# Patient Record
Sex: FEMALE | Race: WHITE | NOT HISPANIC OR LATINO | Employment: FULL TIME | URBAN - NONMETROPOLITAN AREA
[De-identification: names, ages, dates, MRNs, and addresses within clinical notes are randomized per-mention and may not be internally consistent; named-entity substitution may affect disease eponyms.]

---
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
## 2023-04-25 DIAGNOSIS — N93.9 ABNORMAL UTERINE BLEEDING: ICD-10-CM

## 2023-04-25 RX ORDER — LEVONORGESTREL AND ETHINYL ESTRADIOL 0.1-0.02MG
KIT ORAL
Qty: 84 TABLET | Refills: 3 | Status: SHIPPED | OUTPATIENT
Start: 2023-04-25

## 2023-04-26 NOTE — TELEPHONE ENCOUNTER
Received request via: Pharmacy    Was the patient seen in the last year in this department? Yes    Does the patient have an active prescription (recently filled or refills available) for medication(s) requested? No    Does the patient have snf Plus and need 100 day supply (blood pressure, diabetes and cholesterol meds only)? Patient does not have SCP    Last office Visit:07/21/2023  Last labs:07/08/2022

## 2023-11-21 ENCOUNTER — OCCUPATIONAL MEDICINE (OUTPATIENT)
Dept: URGENT CARE | Facility: PHYSICIAN GROUP | Age: 18
End: 2023-11-21
Payer: COMMERCIAL

## 2023-11-21 VITALS
HEIGHT: 67 IN | RESPIRATION RATE: 18 BRPM | SYSTOLIC BLOOD PRESSURE: 124 MMHG | TEMPERATURE: 98.3 F | HEART RATE: 100 BPM | WEIGHT: 292 LBS | OXYGEN SATURATION: 97 % | DIASTOLIC BLOOD PRESSURE: 64 MMHG | BODY MASS INDEX: 45.83 KG/M2

## 2023-11-21 DIAGNOSIS — S09.92XA INJURY OF NOSE, INITIAL ENCOUNTER: ICD-10-CM

## 2023-11-21 DIAGNOSIS — S09.90XA INJURY OF HEAD, INITIAL ENCOUNTER: ICD-10-CM

## 2023-11-21 DIAGNOSIS — R51.9 ACUTE NONINTRACTABLE HEADACHE, UNSPECIFIED HEADACHE TYPE: ICD-10-CM

## 2023-11-21 PROCEDURE — 3078F DIAST BP <80 MM HG: CPT | Performed by: FAMILY MEDICINE

## 2023-11-21 PROCEDURE — 99213 OFFICE O/P EST LOW 20 MIN: CPT | Performed by: FAMILY MEDICINE

## 2023-11-21 PROCEDURE — 3074F SYST BP LT 130 MM HG: CPT | Performed by: FAMILY MEDICINE

## 2023-11-21 ASSESSMENT — FIBROSIS 4 INDEX: FIB4 SCORE: 0.24

## 2023-11-21 NOTE — LETTER
[facility]  [address] [place] [address]  Phone:  [phone] - Fax:  [phone]   [facility] Progress Report and Disability Certification  Date of Service: 11/21/2023   No Show:  No  Date / Time of Next Visit: 11/28/2023   Claim Information   Patient Name: Sabi Blank  Claim Number:     Employer: [employer]  Date of Injury: 11/21/2023     Insurer / TPA: [employer]  ID / SSN:     Occupation:   Diagnosis: Diagnoses of Injury of head, initial encounter, Acute nonintractable headache, unspecified headache type, and Injury of nose, initial encounter were pertinent to this visit.    Medical Information   Related to Industrial Injury?      Subjective Complaints:  DOI: 11/21/2023   QIANA: She was helping a student nap and he accidentally head budded the patient's nose. There was no vomiting or loss of consciousness. She subsequently has a headache, the pain is located in her center forehead, the pain is described as tingling and aching, currently rated 3/10. She has not yet had anything for pain. Denies prior head injury. No secondary employment.     Nose trauma red flags:  -Can you breathe through both sides of your nose: yes  -Are you having trouble speaking: no  -Do have double vision or any other trouble with your vision: no  -Is your hearing normal: no  -Are you experiencing numbness to your face: no  -Have you had previous facial injury or surgery: no  -Do your teeth come together the way they did yesterday: yes  -Are any of your teeth painful or loose: no  -Are you bleeding from your mouth, nose, or ears: no     Objective Findings: No discolorations or deformities on inspection of face.  Cranial nerves II through XII grossly intact.  Normal gait   Pre-Existing Condition(s):     Assessment:   Initial Visit    Status: Additional Care Required  Permanent Disability:No    Plan:      Diagnostics:      Comments:  -We will  trial full duty  -Ice, heat, Tylenol, and ibuprofen as needed for nose/head pain    Disability Information   Status: Released to Full Duty    From:  11/21/2023  Through: 11/28/2023 Restrictions are: Temporary   Physical Restrictions   Sitting:    Standing:    Stooping:    Bending:      Squatting:    Walking:    Climbing:    Pushing:      Pulling:    Other:    Reaching Above Shoulder (L):   Reaching Above Shoulder (R):       Reaching Below Shoulder (L):    Reaching Below Shoulder (R):      Not to exceed Weight Limits   Carrying(hrs):   Weight Limit(lb):   Lifting(hrs):   Weight  Limit(lb):     Comments:      Repetitive Actions   Hands: i.e. Fine Manipulations from Grasping:     Feet: i.e. Operating Foot Controls:     Driving / Operate Machinery:     Health Care Provider’s Original or Electronic Signature  Marielle Good M.D. Health Care Provider’s Original or Electronic Signature    Tae Parker DO MPH     Clinic Name / Location: [address] [address] Clinic Phone Number: Dept: [phone]   Appointment Time: 2:45 Pm Visit Start Time: 3:22 PM   Check-In Time:  3:17 Pm Visit Discharge Time: 4:00 PM   Original-Treating Physician or Chiropractor    Page 2-Insurer/TPA    Page 3-Employer    Page 4-Employee       English

## 2023-11-21 NOTE — PROGRESS NOTES
"  Subjective:     18 y.o. female presents for Facial Injury (Work[employer] Comp [employer]. Lisa head hit nose. Swelling, headache)      DOI: 11/21/2023   QIANA: She was helping a student nap and he accidentally head budded the patient's nose. There was no vomiting or loss of consciousness. She subsequently has a headache, the pain is located in her center forehead, the pain is described as tingling and aching, currently rated 3/10. She has not yet had anything for pain. Denies prior head injury. No secondary employment.     Nose trauma red flags:  -Can you breathe through both sides of your nose: yes  -Are you having trouble speaking: no  -Do have double vision or any other trouble with your vision: no  -Is your hearing normal: no  -Are you experiencing numbness to your face: no  -Have you had previous facial injury or surgery: no  -Do your teeth come together the way they did yesterday: yes  -Are any of your teeth painful or loose: no  -Are you bleeding from your mouth, nose, or ears: no      PMH:   No pertinent past medical history to this problem  MEDS:  Medications were reviewed in EMR  ALLERGIES:  Allergies were reviewed in EMR  FH:   No pertinent family history to this problem     Objective:     /64 (BP Location: Left arm, Patient Position: Sitting, BP Cuff Size: Adult)   Pulse 100   Temp 36.8 °C (98.3 °F) (Temporal)   Resp 18   Ht 1.702 m (5' 7\")   Wt (!) 132 kg (292 lb)   SpO2 97%   BMI 45.73 kg/m²     No discolorations or deformities on inspection of face.  Cranial nerves II through XII grossly intact.  Normal gait    Assessment/Plan:     1. Injury of head, initial encounter    2. Acute nonintractable headache, unspecified headache type    3. Injury of nose, initial encounter    Released to Full Duty FROM 11/21/2023 TO 11/28/2023     -We will trial full duty  -Ice, heat, Tylenol, and ibuprofen as needed for nose/head pain    Differential diagnosis, natural history, supportive care, and indications for " immediate follow-up discussed.

## 2023-11-21 NOTE — LETTER
"    EMPLOYEE’S CLAIM FOR COMPENSATION/ REPORT OF INITIAL TREATMENT  FORM C-4  PLEASE TYPE OR PRINT    EMPLOYEE’S CLAIM - PROVIDE ALL INFORMATION REQUESTED   First Name                    PLACIDO Celestin Last Name  Abhay Birthdate                    2005                Sex  []M  []F Claim Number (Insurer’s Use Only)     Home Address  [address] Age  18 y.o. Height  1.702 m (5' 7\") Weight  (!) 132 kg (292 lb) Social Security Number     [facility] Zip  [address] Telephone  [phone] (home)    Mailing Address  [address] [facility] Zip  [address] Primary Language Spoken  English    INSURER  [employer]   [employer]   Employee's Occupation (Job Title) When Injury or Occupational Disease Occurred      Employer's Name/Company Name  [employer]  Telephone  [phone]    Office Mail Address (Number and Street)  [facility] [employer]      Date of Injury (if applicable) 11/21/2023               Hours Injury (if applicable)            am               pm Date Employer Notified  11/21/2023 Last Day of Work after Injury or Occupational Disease  11/21/2023 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 (if applicable)  I was putting a child to bed    How did this injury or occupational disease occur? (Be specific and answer in detail. Use additional sheet if necessary)  I was putting a child to bed. I was laying next to the child. the child rolled over their head hit my nose.   If you believe that you have an occupational disease, when did you first have knowledge of the disability and its relationship to your employment?  n/a Witnesses to the Accident (if applicable)  n/a      Nature of Injury or " Occupational Disease  Workers' Compensation  Part(s) of Body Injured or Affected  Nose N/A N/A    I CERTIFY THAT THE ABOVE IS TRUE AND CORRECT TO T HE BEST OF MY KNOWLEDGE AND THAT I HAVE PROVIDED THIS INFORMATION IN ORDER TO OBTAIN THE BENEFITS OF [place], OR [place]).  I HEREBY AUTHORIZE ANY PHYSICIAN, CHIROPRACTOR, SURGEON, PRACTITIONER OR ANY OTHER PERSON, ANY HOSPITAL, INCLUDING [facility] OR [facility], ANY  MEDICAL SERVICE ORGANIZATION, ANY INSURANCE COMPANY, OR OTHER INSTITUTION OR ORGANIZATION TO RELEASE TO EACH OTHER, ANY MEDICAL OR OTHER INFORMATION, INCLUDING BENEFITS PAID OR PAYABLE, PERTINENT TO THIS INJURY OR DISEASE, EXCEPT INFORMATION RELATIVE TO DIAGNOSIS, TREATMENT AND/OR COUNSELING FOR AIDS, PSYCHOLOGICAL CONDITIONS, ALCOHOL OR CONTROLLED SUBSTANCES, FOR WHICH I MUST GIVE SPECIFIC AUTHORIZATION.  A PHOTOSTAT OF THIS AUTHORIZATION SHALL BE VALID AS THE ORIGINAL.     Date   Place Employee’s Original or  *Electronic Signature   THIS REPORT MUST BE COMPLETED AND MAILED WITHIN 3 WORKING DAYS OF TREATMENT   Place  [facility]    Name of Facility  [place]   Date 11/21/2023 Diagnosis and Description of Injury or Occupational Disease  (S09.90XA) Injury of head, initial encounter  (R51.9) Acute nonintractable headache, unspecified headache type  (S09.92XA) Injury of nose, initial encounter  Diagnoses of Injury of head, initial encounter, Acute nonintractable headache, unspecified headache type, and Injury of nose, initial encounter were pertinent to this visit. Is there evidence that the injured employee was under the influence of alcohol and/or another controlled substance at the time of accident?  []No  [] Yes (if yes, please explain)   Hour 3:22 PM  No   Treatment: -We will trial full duty  -Ice, heat, Tylenol, and ibuprofen as needed for nose/head pain    Have you advised the  patient to remain off work five days or more?   [] Yes Indicate dates: From   To    []No If no, is the injured employee capable of: [] full duty [] modified duty                                                             Yes     If modified duty, specify any limitations / restrictions:                                                                                                                                                                                                                                                                                                                                                                                                                  X-Ray Findings:      From information given by the employee, together with medical evidence, can you directly connect this injury or occupational disease as job incurred?  []Yes   [] No Yes    Is additional medical care by a physician indicated? []Yes [] No  Yes    Do you know of any previous injury or disease contributing to this condition or occupational disease? []Yes [] No (Explain if yes)                          No   Date  11/21/2023 Print Health Care Provider’s Name  Shikha Good M.D. I certify that the employer’s copy of  this form was delivered to the employer on:   Address  [address] [place] [address]. INSURER'S USE ONLY                       [facility]  [address] Provider’s Tax ID Number  [id number]   Telephone  Dept: [phone]    Health Care Provider’s Original or Electronic Signature  e-SHIKHA Dominguez M.D. Degree (MD,DO, DC,PA-C,APRN)  MD  Choose (if applicable)      ORIGINAL - TREATING HEALTHCARE PROVIDER PAGE 2 - INSURER/TPA PAGE 3 - EMPLOYER PAGE 4 - EMPLOYEE             Form C-4 (rev.08/23)        BRIEF DESCRIPTION OF RIGHTS AND BENEFITS  (Pursuant to [place] 616C.050)    Notice of Injury or Occupational Disease (Incident Report Form C-1): If an injury or occupational disease (OD) arises out of and in the course  "of employment, you must provide written notice to your employer as soon as practicable, but no later than 7 days after the accident or OD. Your employer shall maintain a sufficient supply of the required forms.    Claim for Compensation (Form C-4): If medical treatment is sought, the form C-4 is available at the place of initial treatment. A completed \"Claim for Compensation\" (Form C-4) must be filed within 90 days after an accident or OD. The treating physician or chiropractor must, within 3 working days after treatment, complete and mail to the employer, the employer's insurer and third-party , the Claim for Compensation.    Medical Treatment: If you require medical treatment for your on-the-job injury or OD, you may be required to select a physician or chiropractor from a list provided by your workers’ compensation insurer, if it has contracted with an Organization for Managed Care (MCO) or Preferred Provider Organization (PPO) or providers of health care. If your employer has not entered into a contract with an MCO or PPO, you may select a physician or chiropractor from the Panel of Physicians and Chiropractors. Any medical costs related to your industrial injury or OD will be paid by your insurer.    Temporary Total Disability (TTD): If your doctor has certified that you are unable to work for a period of at least 5 consecutive days, or 5 cumulative days in a 20-day period, or places restrictions on you that your employer does not accommodate, you may be entitled to TTD compensation.    Temporary Partial Disability (TPD): If the wage you receive upon reemployment is less than the compensation for TTD to which you are entitled, the insurer may be required to pay you TPD compensation to make up the difference. TPD can only be paid for a maximum of 24 months.    Permanent Partial Disability (PPD): When your medical condition is stable and there is an indication of a PPD as a result of your injury or " OD, within 30 days, your insurer must arrange for an evaluation by a rating physician or chiropractor to determine the degree of your PPD. The amount of your PPD award depends on the date of injury, the results of the PPD evaluation, your age and wage.    Permanent Total Disability (PTD): If you are medically certified by a treating physician or chiropractor as permanently and totally disabled and have been granted a PTD status by your insurer, you are entitled to receive monthly benefits not to exceed 66 2/3% of your average monthly wage. The amount of your PTD payments is subject to reduction if you previously received a lump-sum PPD award.    Vocational Rehabilitation Services: You may be eligible for vocational rehabilitation services if you are unable to return to the job due to a permanent physical impairment or permanent restrictions as a result of your injury or occupational disease.    Transportation and Per [place] Reimbursement: You may be eligible for travel expenses and per catalina associated with medical treatment.    Reopening: You may be able to reopen your claim if your condition worsens after claim closure.     Appeal Process: If you disagree with a written determination issued by the insurer or the insurer does not respond to your request, you may appeal to the Department of Administration, , by following the instructions contained in your determination letter. You must appeal the determination within 70 days from the date of the determination letter at [address], [address], [place], [place] [address], or [address], [place] [address]. If you disagree with the  decision, you may appeal to the Department of Administration, . You must file your appeal within 30 days from the date of the  decision letter at [address], [address], [place], [place] [address], or [address], [facility]  [place], [place] [address]. If you disagree with a decision of an , you may file a petition for judicial review with the District Court. You must do so within 30 days of the Appeal Officer’s decision. You may be represented by an  at your own expense or you may contact the [facility] for possible representation.    [place]  for Injured Workers (NAIW): If you disagree with a  decision, you may request that NAIW represent you without charge at an  Hearing. For information regarding denial of benefits, you may contact the [facility] at: [address], [address], [place] [address], [phone], or [address], [place] [address], [phone]    To File a Complaint with the Division: If you wish to file a complaint with the  of the Division of Industrial Relations (DIR),  please contact the Workers’ Compensation Section, [address], [address], [place], [place] [address], telephone ([phone]) [phone], or [address], [address], [place], [place] [address], telephone [phone].    For assistance with Workers’ Compensation Issues: You may contact the [facility] Office for Consumer Health Assistance, [address], [address], [place] [address], Toll Free 1-678.612.8134, Web site: http://[facility].[place].gov/Programs/NUHA E-mail: nuha@[facility].nv.[facility]              __________________________________________________________________                                    _________________            Employee Name / Signature                                                                                                                            Date                                                                                                                                                                                                                              D-2 (rev. 10/20)

## 2023-11-28 ENCOUNTER — OCCUPATIONAL MEDICINE (OUTPATIENT)
Dept: URGENT CARE | Facility: PHYSICIAN GROUP | Age: 18
End: 2023-11-28
Payer: COMMERCIAL

## 2023-11-28 VITALS
TEMPERATURE: 98 F | BODY MASS INDEX: 20.72 KG/M2 | RESPIRATION RATE: 18 BRPM | OXYGEN SATURATION: 96 % | DIASTOLIC BLOOD PRESSURE: 60 MMHG | HEIGHT: 67 IN | SYSTOLIC BLOOD PRESSURE: 120 MMHG | WEIGHT: 132 LBS | HEART RATE: 81 BPM

## 2023-11-28 DIAGNOSIS — S09.90XD CLOSED HEAD INJURY, SUBSEQUENT ENCOUNTER: ICD-10-CM

## 2023-11-28 DIAGNOSIS — S09.92XD INJURY OF NOSE, SUBSEQUENT ENCOUNTER: ICD-10-CM

## 2023-11-28 PROCEDURE — 3074F SYST BP LT 130 MM HG: CPT

## 2023-11-28 PROCEDURE — 99213 OFFICE O/P EST LOW 20 MIN: CPT

## 2023-11-28 PROCEDURE — 3078F DIAST BP <80 MM HG: CPT

## 2023-11-28 ASSESSMENT — FIBROSIS 4 INDEX: FIB4 SCORE: 0.24

## 2023-11-28 NOTE — PROGRESS NOTES
"Subjective:     Sabi Blank is a 18 y.o. female who presents for Follow-Up (Workers Comp fv nose injury)      DOI: 11/21/2023 follow-up visit #2 today 11/20/2023.  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 There was no vomiting or loss of consciousness. She subsequently had a headache, the pain was located in her center forehead,  described as tingling and aching, currently this has resolved. She has not did anything for pain last few days. Denies prior head injury. No secondary employment.     PMH:   No pertinent past medical history to this problem  MEDS:  Medications were reviewed in EMR  ALLERGIES:  Allergies were reviewed in EMR  SOCHX:  Works as a teacher  FH:   No pertinent family history to this problem       Objective:     /60 (BP Location: Left arm, Patient Position: Sitting, BP Cuff Size: Adult)   Pulse 81   Temp 36.7 °C (98 °F) (Temporal)   Resp 18   Ht 1.702 m (5' 7\")   Wt 59.9 kg (132 lb)   SpO2 96%   BMI 20.67 kg/m²     There is no erythema, edema, ecchymosis, or any other signs of infection or trauma to the patient's nose, face or forehead.  Neurological exam is negative for any focal deficits, cranial nerves II through XII are grossly intact.  ENT exam is normal, there is no nasal septal deviation, no signs of bleeding or trauma.  Patient states she feels fine today, is back to normal, states she has been working full duty without a problem    Assessment/Plan:       1. Injury of nose, subsequent encounter    2. Closed head injury, subsequent encounter    Released to Full Duty FROM   TO       Discharged today, released to full duty, no restrictions, we discussed red flags and reasons to return to urgent care versus when to go to the emergency room, patient states she understands all instructions and is agreeable to plan of care.    Differential diagnosis, natural history, supportive care, and indications for immediate follow-up " discussed.

## 2023-11-28 NOTE — LETTER
[facility] [place]  [address] [place] [address]  Phone:  [phone] - Fax:  [phone]   [facility] Progress Report and Disability Certification  Date of Service: 11/28/202  No Show:  No  Date / Time of Next Visit:     Claim Information   Patient Name: Sabi Blank  Claim Number:     Employer: [employer]  Date of Injury: 11/21/2023     Insurer / TPA: [employer]  ID / SSN:     Occupation:   Diagnosis: Diagnoses of Injury of nose, subsequent encounter and Closed head injury, subsequent encounter were pertinent to this visit.    Medical Information   Related to Industrial Injury?      Subjective Complaints:  DOI: 11/21/2023 follow-up visit #2 today 11/20/2023.  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 There was no vomiting or loss of consciousness. She subsequently had a headache, the pain was located in her center forehead,  described as tingling and aching, currently this has resolved. She has not did anything for pain last few days. Denies prior head injury. No secondary employment.    Objective Findings: There is no erythema, edema, ecchymosis, or any other signs of infection or trauma to the patient's nose, face or forehead.  Neurological exam is negative for any focal deficits, cranial nerves II through XII are grossly intact.  ENT exam is normal, there is no nasal septal deviation, no signs of bleeding or trauma.  Patient states she feels fine today, is back to normal, states she has been working full duty without a problem   Pre-Existing Condition(s): None   Assessment:   Condition Improved    Status: Discharged /  MMI  Permanent Disability:     Plan:      Diagnostics:      Comments:  Discharged today, released to full duty, no restrictions, we discussed red flags and reasons to return to urgent care versus when to go to the emergency room, patient states she  understands all instructions and is agreeable to plan of care.    Disability Information   Status: Released to Full Duty    From:     Through:   Restrictions are:     Physical Restrictions   Sitting:    Standing:    Stooping:    Bending:      Squatting:    Walking:    Climbing:    Pushing:      Pulling:    Other:    Reaching Above Shoulder (L):   Reaching Above Shoulder (R):       Reaching Below Shoulder (L):    Reaching Below Shoulder (R):      Not to exceed Weight Limits   Carrying(hrs):   Weight Limit(lb):   Lifting(hrs):   Weight  Limit(lb):     Comments:      Repetitive Actions   Hands: i.e. Fine Manipulations from Grasping:     Feet: i.e. Operating Foot Controls:     Driving / Operate Machinery:     Health Care Provider’s Original or Electronic Signature  SCOTT Magana Health Care Provider’s Original or Electronic Signature    Tae Parker DO MPH     Clinic Name / Location: [address] [address] Clinic Phone Number: Dept: [phone]   Appointment Time: 8:00 Am Visit Start Time: 8:14 AM   Check-In Time:  8:04 Am Visit Discharge Time:  9:15AM   Original-Treating Physician or Chiropractor    Page 2-Insurer/TPA    Page 3-Employer    Page 4-Employee

## 2024-05-08 ENCOUNTER — APPOINTMENT (OUTPATIENT)
Dept: URGENT CARE | Facility: CLINIC | Age: 19
End: 2024-05-08
Payer: COMMERCIAL

## 2024-05-08 ENCOUNTER — OFFICE VISIT (OUTPATIENT)
Dept: URGENT CARE | Facility: CLINIC | Age: 19
End: 2024-05-08
Payer: COMMERCIAL

## 2024-05-08 VITALS
TEMPERATURE: 98.4 F | BODY MASS INDEX: 45.52 KG/M2 | HEART RATE: 108 BPM | WEIGHT: 290 LBS | SYSTOLIC BLOOD PRESSURE: 130 MMHG | RESPIRATION RATE: 12 BRPM | OXYGEN SATURATION: 99 % | HEIGHT: 67 IN | DIASTOLIC BLOOD PRESSURE: 84 MMHG

## 2024-05-08 DIAGNOSIS — Z91.013 SHELLFISH ALLERGY: ICD-10-CM

## 2024-05-08 DIAGNOSIS — M25.561 ACUTE PAIN OF RIGHT KNEE: ICD-10-CM

## 2024-05-08 PROCEDURE — 99213 OFFICE O/P EST LOW 20 MIN: CPT | Performed by: NURSE PRACTITIONER

## 2024-05-08 PROCEDURE — 3079F DIAST BP 80-89 MM HG: CPT | Performed by: NURSE PRACTITIONER

## 2024-05-08 PROCEDURE — 3075F SYST BP GE 130 - 139MM HG: CPT | Performed by: NURSE PRACTITIONER

## 2024-05-08 ASSESSMENT — FIBROSIS 4 INDEX: FIB4 SCORE: 0.24

## 2024-05-08 ASSESSMENT — ENCOUNTER SYMPTOMS
WEAKNESS: 0
JOINT SWELLING: 1
NUMBNESS: 0

## 2024-05-09 NOTE — PROGRESS NOTES
"Subjective:   Sabi Blank is a 18 y.o. female who presents for Knee Pain (R knee, no known injury)      Knee Pain  This is a new problem. The current episode started 1 to 4 weeks ago (no injury). The problem occurs constantly. The problem has been gradually worsening. Associated symptoms include joint swelling. Pertinent negatives include no numbness or weakness. She has tried rest for the symptoms.       Review of Systems   Musculoskeletal:  Positive for joint pain and joint swelling.   Neurological:  Negative for weakness and numbness.       Medications:  Epi pen  LESSINA-28 Tabs    Allergies: Shellfish allergy    Problem List: Sabi Blank does not have any pertinent problems on file.    Surgical History:  No past surgical history on file.    Past Social Hx: Sabi Blank  reports that she has never smoked. She has never used smokeless tobacco. She reports that she does not drink alcohol and does not use drugs.     Past Family Hx:  Sabi Blank family history includes Arthritis in her paternal grandmother; Heart Disease in her maternal grandmother; Hypertension in her mother; No Known Problems in her father.     Problem list, medications, and allergies reviewed by myself today in Epic.     Objective:     /84   Pulse (!) 108   Temp 36.9 °C (98.4 °F) (Temporal)   Resp 12   Ht 1.702 m (5' 7\")   Wt (!) 132 kg (290 lb)   SpO2 99%   BMI 45.42 kg/m²     Physical Exam  Constitutional:       Appearance: Normal appearance. She is not ill-appearing or toxic-appearing.   HENT:      Head: Normocephalic.      Right Ear: External ear normal.      Left Ear: External ear normal.      Nose: Nose normal.      Mouth/Throat:      Lips: Pink.   Eyes:      General: Lids are normal.   Pulmonary:      Effort: Pulmonary effort is normal. No accessory muscle usage.   Musculoskeletal:      Cervical back: Full passive range of motion without pain.      Right knee: No swelling, deformity or bony " tenderness. Normal range of motion. Tenderness present over the medial joint line.   Neurological:      Mental Status: She is alert and oriented to person, place, and time.   Psychiatric:         Mood and Affect: Mood normal.         Thought Content: Thought content normal.         Assessment/Plan:     Diagnosis and associated orders:     1. Acute pain of right knee  Referral to Orthopedics      2. Shellfish allergy  EPINEPHrine (EPIPEN) 0.3 MG/0.3ML Solution Auto-injector solution for injection           Comments/MDM:     I personally reviewed prior external notes and prior test results pertinent to today's visit.  Patient with no acute injury.  Feel imaging is necessary on today's exam encouraged rest, ice, Ace bandage for compression.  Referral to orthopedics placed.  Discussed management options, risks and benefits, and alternatives to treatment plan agreed upon.   Red flags discussed and indications to immediately call 911 or present to the Emergency Department.   Supportive care, differential diagnoses, and indications for immediate follow-up discussed with patient.    Patient expresses understanding and agrees to plan. Patient denies any other questions or concerns.         Addendum: Patient contacting clinic concerned that EpiPen was discontinued from medical send months.  We updated chart adding EpiPen back to medications           Please note that this dictation was created using voice recognition software. I have made a reasonable attempt to correct obvious errors, but I expect that there are errors of grammar and possibly content that I did not discover before finalizing the note.    This note was electronically signed by Yobani SAUNDERS.

## 2024-05-10 RX ORDER — EPINEPHRINE 0.3 MG/.3ML
INJECTION SUBCUTANEOUS
Qty: 2 EACH | Refills: 1 | Status: SHIPPED | OUTPATIENT
Start: 2024-05-10

## 2024-06-03 ENCOUNTER — DOCUMENTATION (OUTPATIENT)
Dept: HEALTH INFORMATION MANAGEMENT | Facility: OTHER | Age: 19
End: 2024-06-03
Payer: COMMERCIAL

## 2024-07-01 ENCOUNTER — APPOINTMENT (OUTPATIENT)
Dept: URGENT CARE | Facility: CLINIC | Age: 19
End: 2024-07-01
Payer: COMMERCIAL

## 2024-07-01 ENCOUNTER — NON-PROVIDER VISIT (OUTPATIENT)
Dept: URGENT CARE | Facility: CLINIC | Age: 19
End: 2024-07-01

## 2024-07-01 DIAGNOSIS — Z11.1 PPD SCREENING TEST: ICD-10-CM

## 2024-07-01 PROCEDURE — 86580 TB INTRADERMAL TEST: CPT

## 2024-07-03 ENCOUNTER — NON-PROVIDER VISIT (OUTPATIENT)
Dept: URGENT CARE | Facility: CLINIC | Age: 19
End: 2024-07-03

## 2024-07-03 LAB — TB WHEAL 3D P 5 TU DIAM: NORMAL MM

## 2024-09-18 PROBLEM — M21.42: Status: ACTIVE | Noted: 2024-09-18

## 2024-10-02 DIAGNOSIS — Z00.6 CLINICAL TRIAL PARTICIPANT: ICD-10-CM

## 2024-10-03 ENCOUNTER — RESEARCH ENCOUNTER (OUTPATIENT)
Dept: RESEARCH | Facility: MEDICAL CENTER | Age: 19
End: 2024-10-03

## 2024-11-12 DIAGNOSIS — Z00.6 CLINICAL TRIAL PARTICIPANT: ICD-10-CM

## 2025-02-11 ENCOUNTER — NON-PROVIDER VISIT (OUTPATIENT)
Dept: OCCUPATIONAL MEDICINE | Facility: CLINIC | Age: 20
End: 2025-02-11

## 2025-02-11 DIAGNOSIS — Z02.1 PRE-EMPLOYMENT DRUG SCREENING: ICD-10-CM

## 2025-02-11 LAB
AMP AMPHETAMINE: NORMAL
COC COCAINE: NORMAL
INT CON NEG: NORMAL
INT CON POS: NORMAL
MET METHAMPHETAMINES: NORMAL
OPI OPIATES: NORMAL
PCP PHENCYCLIDINE: NORMAL
POC DRUG COMMENT 753798-OCCUPATIONAL HEALTH: NORMAL
THC: NORMAL

## 2025-02-11 PROCEDURE — 80305 DRUG TEST PRSMV DIR OPT OBS: CPT | Performed by: NURSE PRACTITIONER

## 2025-05-14 ENCOUNTER — OFFICE VISIT (OUTPATIENT)
Dept: URGENT CARE | Facility: PHYSICIAN GROUP | Age: 20
End: 2025-05-14
Payer: COMMERCIAL

## 2025-05-14 VITALS
OXYGEN SATURATION: 97 % | WEIGHT: 293 LBS | RESPIRATION RATE: 16 BRPM | HEIGHT: 67 IN | DIASTOLIC BLOOD PRESSURE: 70 MMHG | TEMPERATURE: 97.4 F | BODY MASS INDEX: 45.99 KG/M2 | SYSTOLIC BLOOD PRESSURE: 118 MMHG | HEART RATE: 104 BPM

## 2025-05-14 DIAGNOSIS — J06.9 URI WITH COUGH AND CONGESTION: Primary | ICD-10-CM

## 2025-05-14 PROCEDURE — 3078F DIAST BP <80 MM HG: CPT | Performed by: NURSE PRACTITIONER

## 2025-05-14 PROCEDURE — 99213 OFFICE O/P EST LOW 20 MIN: CPT | Performed by: NURSE PRACTITIONER

## 2025-05-14 PROCEDURE — 3074F SYST BP LT 130 MM HG: CPT | Performed by: NURSE PRACTITIONER

## 2025-05-14 RX ORDER — DEXTROMETHORPHAN HYDROBROMIDE AND PROMETHAZINE HYDROCHLORIDE 15; 6.25 MG/5ML; MG/5ML
5 SYRUP ORAL EVERY 4 HOURS PRN
Qty: 120 ML | Refills: 0 | Status: SHIPPED | OUTPATIENT
Start: 2025-05-14 | End: 2025-05-21

## 2025-05-14 RX ORDER — PREDNISONE 20 MG/1
20 TABLET ORAL DAILY
Qty: 7 TABLET | Refills: 0 | Status: SHIPPED | OUTPATIENT
Start: 2025-05-14 | End: 2025-05-21

## 2025-05-14 ASSESSMENT — ENCOUNTER SYMPTOMS
RHINORRHEA: 1
SORE THROAT: 0
COUGH: 1
SHORTNESS OF BREATH: 1
HEADACHES: 1
MYALGIAS: 0
FEVER: 0
SINUS PAIN: 1

## 2025-05-14 NOTE — LETTER
May 14, 2025    To Whom It May Concern:         This is confirmation that Sabi Blank attended her scheduled appointment with SCOTT Schwartz on 5/14/25. Please excuse her absence due to an acute illness. She may return on 5/19/2025 or sooner if better.          If you have any questions please do not hesitate to call me at the phone number listed below.    Sincerely,          SRINATH Schwartz.  [phone]

## 2025-05-15 NOTE — PROGRESS NOTES
"Subjective:     Sabi Blank is a 19 y.o. female who presents for Cough and Congestion (Sx 1 wk green productive cough. )      Cough  This is a new problem. The current episode started in the past 7 days (Sabi is a pleasant 19 year old female who presents to  today with complaints of congestion and cough X 5 days). The problem has been gradually worsening. The cough is Productive of purulent sputum. Associated symptoms include headaches, nasal congestion, rhinorrhea and shortness of breath (from coughing). Pertinent negatives include no fever, myalgias or sore throat. She has tried rest (Mucinex) for the symptoms.         Review of Systems   Constitutional:  Positive for malaise/fatigue. Negative for fever.   HENT:  Positive for congestion, rhinorrhea and sinus pain. Negative for sore throat.    Respiratory:  Positive for cough and shortness of breath (from coughing).    Musculoskeletal:  Negative for myalgias.   Neurological:  Positive for headaches.       PMH: Past Medical History[1]  ALLERGIES: Allergies[2]  SURGHX: Past Surgical History[3]  SOCHX: Social History[4]  FH:   Family History   Problem Relation Age of Onset    Hypertension Mother     No Known Problems Father     Heart Disease Maternal Grandmother     Arthritis Paternal Grandmother          Objective:   /70   Pulse (!) 104   Temp 36.3 °C (97.4 °F) (Temporal)   Resp 16   Ht 1.702 m (5' 7\")   Wt (!) 153 kg (338 lb)   SpO2 97%   BMI 52.94 kg/m²     Physical Exam  Vitals and nursing note reviewed.   Constitutional:       General: She is not in acute distress.     Appearance: Normal appearance. She is normal weight. She is ill-appearing. She is not toxic-appearing.   HENT:      Head: Normocephalic.      Right Ear: External ear normal.      Left Ear: Tympanic membrane, ear canal and external ear normal.      Nose: Congestion present. No rhinorrhea.      Mouth/Throat:      Mouth: Mucous membranes are moist.      Pharynx: No " oropharyngeal exudate or posterior oropharyngeal erythema.   Eyes:      General:         Right eye: No discharge.         Left eye: No discharge.      Pupils: Pupils are equal, round, and reactive to light.   Cardiovascular:      Rate and Rhythm: Normal rate and regular rhythm.      Pulses: Normal pulses.      Heart sounds: Normal heart sounds.   Pulmonary:      Effort: Pulmonary effort is normal. No respiratory distress.      Breath sounds: No stridor. No wheezing, rhonchi or rales.   Chest:      Chest wall: No tenderness.   Abdominal:      General: Abdomen is flat.   Musculoskeletal:         General: Normal range of motion.      Cervical back: Normal range of motion and neck supple.   Skin:     General: Skin is dry.   Neurological:      General: No focal deficit present.      Mental Status: She is alert and oriented to person, place, and time. Mental status is at baseline.   Psychiatric:         Mood and Affect: Mood normal.         Behavior: Behavior normal.         Thought Content: Thought content normal.         Judgment: Judgment normal.         Assessment/Plan:   Assessment    1. URI with cough and congestion  DX-CHEST-2 VIEWS    predniSONE (DELTASONE) 20 MG Tab    promethazine-dextromethorphan (PROMETHAZINE-DM) 6.25-15 MG/5ML syrup        Prescriptions called into pharmacy. AVS printed and reviewed with pt. Red flags identified of when to seek care back in UC or ER including SOB, increased fever and worsening symptoms. Symptomatic treatment such as OTC Ibuprofen/ Acetaminophen, increased fluids, and humidifier encouraged Pt in agreement with care plan today.         [1]   Past Medical History:  Diagnosis Date    Abnormal uterine bleeding 10/19/2021    Right knee pain 8/14/2014    Seasonal allergies 8/14/2014   [2]   Allergies  Allergen Reactions    Shellfish Allergy      Hives just being around in the same room as the shell fish    [3]   Past Surgical History:  Procedure Laterality Date    PB OSTEOTOMY  MIDTARSAL BONES Left 11/14/2024    Procedure: LEFT GASTROCSOLEUS RECESSION, LEFT MINIMALLY INVASIVE CALCANEAL SLIDE;  Surgeon: Simeon Martinez M.D.;  Location: [facility];  Service: Orthopedics    PB REPAIR FLEX LEG TENDON,SECOND,EA Left 11/14/2024    Procedure: LEFT SPRING LIGAMENT REPAIR;  Surgeon: Simeon Martinez M.D.;  Location: [facility];  Service: Orthopedics    PB REPAIR BOTH COLLAT ANKL LIGMT,PBIMRY Left 11/14/2024    Procedure: LEFT POSTERIOR TIBIAL TENDON REPAIR WITH;  Surgeon: Simeon Martinez M.D.;  Location: [facility];  Service: Orthopedics    PB XFER SINGLE DEEP LOW LEG TENDON Left 11/14/2024    Procedure: LEFT FLEXOR DIGITORUM LONGUS TRANSFER;  Surgeon: Simeon Martinez M.D.;  Location: [facility];  Service: Orthopedics    PB OSTEOTOMY HEEL BONE Left 11/14/2024    Procedure: LEFT COTTON OSTEOTOMY, LEFT TEMPLATE ALLOGRAFT;  Surgeon: Simeon Martinez M.D.;  Location: [facility];  Service: Orthopedics    PB TARSAL TUNNEL RELEASE Left 11/14/2024    Procedure: LEFT TARSAL TUNNEL RELEASE;  Surgeon: Simeon Martinez M.D.;  Location: [facility];  Service: Orthopedics   [4]   Social History  Socioeconomic History    Marital status: Single    Highest education level: 12th grade   Tobacco Use    Smoking status: Never    Smokeless tobacco: Never   Vaping Use    Vaping status: Every Day    Substances: Nicotine, THC (last used monday)   Substance and Sexual Activity    Alcohol use: Never     Alcohol/week: 0.0 oz    Drug use: Never    Sexual activity: Never     Social Drivers of Health     Financial Resource Strain: Low Risk  (5/17/2022)    Overall Financial Resource Strain (CARDIA)     Difficulty of Paying Living Expenses: Not hard at all   Food Insecurity: No Food Insecurity (5/17/2022)    Hunger Vital Sign     Worried About Running Out of Food in the Last Year: Never true     Ran Out  of Food in the Last Year: Never true   Transportation Needs: No Transportation Needs (5/17/2022)    PRAPARE - Transportation     Lack of Transportation (Medical): No     Lack of Transportation (Non-Medical): No   Physical Activity: Sufficiently Active (5/17/2022)    Exercise Vital Sign     Days of Exercise per Week: 4 days     Minutes of Exercise per Session: 40 min   Stress: No Stress Concern Present (5/17/2022)    [employer] [place] [employer] - Occupational Stress Questionnaire     Feeling of Stress : Only a little   Social Connections: Socially Isolated (5/17/2022)    Social Connection and Isolation Panel [NHANES]     Frequency of Communication with Friends and Family: More than three times a week     Frequency of Social Gatherings with Friends and Family: More than three times a week     Attends [employer] Services: Never     Active Member of Clubs or Organizations: No     Attends Club or Organization Meetings: Never     Marital Status: Never    Housing Stability: Low Risk  (5/17/2022)    Housing Stability Vital Sign     Unable to Pay for Housing in the Last Year: No     Number of Places Lived in the Last Year: 1     Unstable Housing in the Last Year: No